# Patient Record
Sex: MALE | Employment: FULL TIME | URBAN - METROPOLITAN AREA
[De-identification: names, ages, dates, MRNs, and addresses within clinical notes are randomized per-mention and may not be internally consistent; named-entity substitution may affect disease eponyms.]

---

## 2023-08-01 LAB
HEPATITIS B VIRUS SURFACE AG PRESENCE IN SERUM: NONREACTIVE
HEPATITIS C VIRUS AB PRESENCE IN SERUM: NONREACTIVE
HIV 1/ 2 AG/AB SCREEN: NONREACTIVE
SYPHILIS TOTAL AB: NONREACTIVE

## 2023-08-02 LAB
CHLAMYDIA TRACH., AMPLIFIED: NEGATIVE
N. GONORRHEA, AMPLIFIED: NEGATIVE

## 2024-10-11 DIAGNOSIS — Z11.3 ENCOUNTER FOR SCREENING EXAMINATION FOR SEXUALLY TRANSMITTED DISEASE: ICD-10-CM

## 2024-10-11 DIAGNOSIS — Z31.41 FERTILITY TESTING: ICD-10-CM

## 2025-02-04 DIAGNOSIS — Z11.3 ENCOUNTER FOR SCREENING EXAMINATION FOR SEXUALLY TRANSMITTED DISEASE: ICD-10-CM

## 2025-02-04 DIAGNOSIS — Z31.41 FERTILITY TESTING: ICD-10-CM

## 2025-03-01 LAB
C TRACH RRNA SPEC QL NAA+PROBE: NOT DETECTED
HBV SURFACE AG SERPL QL IA: NORMAL
HCV AB SERPL QL IA: NORMAL
HIV 1+2 AB+HIV1 P24 AG SERPL QL IA: NORMAL
N GONORRHOEA RRNA SPEC QL NAA+PROBE: NOT DETECTED
QUEST GC CT AMPLIFIED (ALWAYS MESSAGE): NORMAL
T PALLIDUM AB SER QL IA: NEGATIVE

## 2025-03-05 ENCOUNTER — ANCILLARY PROCEDURE (OUTPATIENT)
Dept: ENDOCRINOLOGY | Facility: CLINIC | Age: 35
End: 2025-03-05

## 2025-03-05 DIAGNOSIS — Z31.41 FERTILITY TESTING: ICD-10-CM

## 2025-03-05 LAB
# OF VIALS: 2
% EX RESIDUAL CYTOPLASM (SEMEN): 0 %
% HEAD DEFECTS (SEMEN): 96.3 %
% NECK MIDPIECE (SEMEN): 12.5 %
% NORMAL (SEMEN): 3.8 % (ref 4–?)
% TAIL DEFECTS (SEMEN): 9.8 %
ABSTINENCE (DAYS): 3 DAYS (ref 2–7)
AGGLUTINATION (SEMEN): NO
ANALYZED TIME:: ABNORMAL
ANDROLOGY LAB ID#: ABNORMAL
CLUMPS (SEMEN): NO
COLLECTED COMPLETELY: YES
COLLECTION LOCATION:: ABNORMAL
COLLECTION METHOD:: ABNORMAL
CONCENTRATION CN (POST-WASH): 5.37 MILL/ML
CONCENTRATION(SEMEN): 10.5 MILL/ML (ref 15–?)
DEBRIS (SEMEN): YES
LEUKOCYTE (SEMEN): ABNORMAL
NON PROG. MOTILITY (SEMEN): 3 %
NON PROG. MOTILITY CN (POST-WASH): 5 %
PROG. MOTILITY (SEMEN): 58 % (ref 32–?)
PROG. MOTILITY CN (POST-WASH): 56 %
RECEIVED TIME:: ABNORMAL
REI PARTNER DOB: ABNORMAL
REI PARTNER NAME: ABNORMAL
SEMEN APPEARANCE: NORMAL
SEMEN LIQUEFACTION: NORMAL
SEMEN VISCOSITY: NORMAL
SPECIMEN ID REI: ABNORMAL
TOT. NO OF NORM. MOTILE SPERM (SEMEN): 0.6 MILL
TOT. NO OF NORM. SPERM (SEMEN): 0.98 MILL
TOTAL MOTILE SPERM PER VIAL (POST-THAW): 2.45 MILL/VIAL
TOTAL MOTILITY (SEMEN): 61 % (ref 40–?)
TOTAL MOTILITY CN (POST-WASH): 61 %
TOTAL NO OF MOTILE (SEMEN): 16.08 MILL
TOTAL NO OF MOTILE CN (POST-WASH): 13.68 MILL
TOTAL NO OF RND CELLS (SEMEN): 0.5 MILL (ref ?–5)
TOTAL NO OF SPERM (SEMEN): 26.25 MILL (ref 39–?)
TOTAL NO OF SPERM CN (POST-WASH): 22.54 MILL
UNIT VOLUME: ABNORMAL
VOLUME (SEMEN): 2.5 ML (ref 1.5–?)
VOLUME CN (POST-WASH): 4.2 ML

## 2025-03-05 PROCEDURE — 89322 SEMEN ANAL STRICT CRITERIA: CPT | Performed by: OBSTETRICS & GYNECOLOGY

## 2025-03-05 PROCEDURE — 89259 CRYOPRESERVATION SPERM: CPT | Performed by: OBSTETRICS & GYNECOLOGY

## 2025-03-08 NOTE — RESULT ENCOUNTER NOTE
Iraj Escobar, I know you have had notes about sperm issues before so this is probably not a surprise, but the count is a bit lower than normal (10 million rather than 15 million which is preferred). Just wanted you to be aware but nothing to be overly concerned about in terms of IVF success. I usually do recommend you see a reproductive urologist if you have not done this before to have a testicular exam and discuss if there are any other concerns that could be causing this or if there are any interventions they can try to improve sperm quality. Lmk if you need a good referral!  Best,  Sara Purdy MD    PLEASE NOTE: My Chart messages are not always checked frequently- there are times that these might not be reviewed or responded to for several days! If you have a time sensitive question, please call either the [place] office ([phone]) or the [place] office ([phone]) during daytime hours and ask to speak with a nurse.

## 2025-04-05 DIAGNOSIS — Z31.41 FERTILITY TESTING: ICD-10-CM

## 2025-04-07 ENCOUNTER — ANCILLARY PROCEDURE (OUTPATIENT)
Dept: ENDOCRINOLOGY | Facility: CLINIC | Age: 35
End: 2025-04-07

## 2025-04-07 DIAGNOSIS — Z31.41 FERTILITY TESTING: ICD-10-CM

## 2025-04-07 LAB
ABSTINENCE (DAYS): 2 DAYS (ref 2–7)
AGGLUTINATION (SEMEN): NO
ANALYZED TIME:: ABNORMAL
ANDROLOGY LAB ID#: ABNORMAL
CLUMPS (SEMEN): NO
COLLECTED COMPLETELY: YES
COLLECTION LOCATION:: ABNORMAL
COLLECTION METHOD:: ABNORMAL
CONCENTRATION CN (POST-WASH): 2.27 MILL/ML
CONCENTRATION(SEMEN): 7.85 MILL/ML (ref 15–?)
DEBRIS (SEMEN): YES
NON PROG. MOTILITY (SEMEN): 4 %
NON PROG. MOTILITY CN (POST-WASH): 2 %
PROG. MOTILITY (SEMEN): 48 % (ref 32–?)
PROG. MOTILITY CN (POST-WASH): 94 %
RECEIVED TIME:: ABNORMAL
REI PARTNER DOB: ABNORMAL
REI PARTNER NAME: ABNORMAL
SEMEN APPEARANCE: NORMAL
SEMEN LIQUEFACTION: NORMAL
SEMEN VISCOSITY: NORMAL
SPERM PROCESS METHOD: ABNORMAL
TOTAL MOTILITY (SEMEN): 52 % (ref 40–?)
TOTAL MOTILITY CN (POST-WASH): 96 %
TOTAL NO OF MOTILE (SEMEN): 11.02 MILL
TOTAL NO OF MOTILE CN (POST-WASH): 1.09 MILL
TOTAL NO OF SPERM (SEMEN): 21.2 MILL (ref 39–?)
TOTAL NO OF SPERM CN (POST-WASH): 1.14 MILL
VOLUME (SEMEN): 2.7 ML (ref 1.5–?)
VOLUME CN (POST-WASH): 0.5 ML